# Patient Record
Sex: MALE | Race: WHITE
[De-identification: names, ages, dates, MRNs, and addresses within clinical notes are randomized per-mention and may not be internally consistent; named-entity substitution may affect disease eponyms.]

---

## 2017-10-25 ENCOUNTER — HOSPITAL ENCOUNTER (OUTPATIENT)
Dept: HOSPITAL 92 - MRI | Age: 57
Discharge: HOME | End: 2017-10-25
Attending: INTERNAL MEDICINE
Payer: COMMERCIAL

## 2017-10-25 DIAGNOSIS — M50.30: ICD-10-CM

## 2017-10-25 DIAGNOSIS — M25.78: ICD-10-CM

## 2017-10-25 DIAGNOSIS — M99.51: ICD-10-CM

## 2017-10-25 DIAGNOSIS — M47.22: Primary | ICD-10-CM

## 2017-10-25 PROCEDURE — 72141 MRI NECK SPINE W/O DYE: CPT

## 2017-10-25 NOTE — MRI
MRI CERVICAL SPINE:

10/25/17

 

HISTORY: 

Cervical radiculopathy, M54.12.

 

Multiplanar and multisequence noncontrast enhanced MRI images cervical spine obtained. 

 

Comparison made to previous exam from 2/21/14. 

 

Marrow signal is unremarkable. 

 

C1-2: Unremarkable. 

 

C2-3: There is a broad based disc osteophyte complex centrally compressing the thecal sac resulting 
in some anterior compression of the thecal sac. No definite evidence of cord compression is seen. Mo
derate bilateral neural foraminal narrowing is seen, worse on the right than on the left. 

 

C3-4: There is disc desiccation and broad based disc osteophyte complex. This is much larger in the 
left lateral recess region resulting in moderate to severe left L3-4 lateral recess stenosis. The ri
ght neural foramen demonstrates moderate to severe stenosis. There is severe left C3-4 neural forami
nal narrowing due to uncovertebral osteophyte hypertrophy. 

 

C4-5: Disc desiccation is seen. There is a broad based disc osteophyte complex compressing the theca
l sac resulting in moderate central stenosis. There is moderate to severe right and moderate left si
ded C4-5 neural foraminal narrowing due to uncovertebral osteophyte hypertrophy. This is not signifi
cantly changed since the previous comparison exam. 

 

C5-6: There is minimal anterolisthesis of C5 on C6. There is broad based disc osteophyte complex com
pressing the thecal sac resulting in mild central spinal stenosis. The neural foramen are patent. 

 

C6-7: Disc desiccation is seen. There is a broad based disc bulge resulting in mild but not signific
ant degree of central stenosis. The neural foramen are patent. 

 

C7-T1: Mild disc desiccation is seen. Minimal left C7-T1 neural foraminal narrowing is seen. The rig
ht neural foramen is patent. 

 

IMPRESSION:  

Multilevel mid and upper cervical degenerative changes with osteophytes and neural foraminal narrowi
ng as described above. No significant interval change is seen since the previous exam from 2/21/14.

 

POS: EMANUEL

## 2018-10-13 ENCOUNTER — HOSPITAL ENCOUNTER (OUTPATIENT)
Dept: HOSPITAL 92 - SCSCT | Age: 58
Discharge: HOME | End: 2018-10-13
Attending: NEUROLOGICAL SURGERY
Payer: COMMERCIAL

## 2018-10-13 DIAGNOSIS — M99.81: ICD-10-CM

## 2018-10-13 DIAGNOSIS — M48.02: ICD-10-CM

## 2018-10-13 DIAGNOSIS — M54.12: Primary | ICD-10-CM

## 2018-10-13 PROCEDURE — 72125 CT NECK SPINE W/O DYE: CPT

## 2018-10-13 NOTE — CT
CERVICAL SPINE CT SCAN WITHOUT IV CONTRAST:

 

Date:  10/13/18 

 

HISTORY:  

57-year-old male with history of radiculopathy, exacerbation of chronic neck pain for decades. Left-s
ided pain. 

 

COMPARISON:  

10/25/17 MRI. 

 

FINDINGS:

No evidence for overt fracture or facet dislocation. 

 

At C2-C3, there is severe bilateral foraminal stenosis with extensive posterior disc osteophytes, wit
h mild central canal and moderate bilateral recess stenosis. 

 

At C3-C4, there are very extensive disc osteophytes, much worse in the left paracentral region, with 
severe bilateral lateral recess stenosis, greater on the left side and severe left foraminal stenosis
, with moderately severe central canal stenosis. 

 

At C4-C5, there is mild lateral recess stenosis and moderate to severe bilateral foraminal stenosis. 


 

At C5-C6, there is moderate bilateral recess stenosis and severe left foraminal stenosis and moderate
 right foraminal stenosis. 

 

At C6-C7, there is mild to moderate lateral recess and foraminal stenosis. 

 

At C7-T1, there is no significant central canal or foraminal stenosis. 

 

IMPRESSION: 

Variable severity, multilevel, up to severe central canal, lateral recess, and foraminal stenosis, mo
st marked at C3-C4 and C2-C3 levels, with little change from prior MRI. 

 

 

POS: EMANUEL

## 2019-01-06 NOTE — HP
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

[facility], who is known to us from chronic evaluation of both neck and lower back

problems, but most significantly bilateral C4 radiculopathy.  After conservative

methods including injections with Dr. Hayes, he hopes to discuss surgery as a

possible option. 



PAST MEDICAL HISTORY:  Significant for migraines, reflux, gout, and osteoarthritis.



ALLERGIES:  NO KNOWN DRUG ALLERGIES.



PHYSICAL EXAMINATION:

The patient is alert and oriented x3.  Gait is normal.  Cervical range of motion

slightly limited.  Bilateral upper extremity motor exam is normal. 



ASSESSMENT:  Cervical radiculopathy.



PLAN:  Dr. Benitez met with the patient, reviewed imaging, advocated for C3-4 ACDF.

He explained to the patient the risks, benefits, and alternatives to the procedure.

The patient expressed understanding and elected to move forward with surgery as

discussed.  I do believe that the patient is mentally competent and capable of

making medical decisions for himself and will move forward with surgery as planned. 







Job ID:  [id number]

## 2019-01-07 ENCOUNTER — HOSPITAL ENCOUNTER (OUTPATIENT)
Dept: HOSPITAL 92 - SDC | Age: 59
Discharge: HOME | End: 2019-01-07
Attending: NEUROLOGICAL SURGERY
Payer: COMMERCIAL

## 2019-01-07 VITALS — BODY MASS INDEX: 26.5 KG/M2

## 2019-01-07 DIAGNOSIS — Z79.82: ICD-10-CM

## 2019-01-07 DIAGNOSIS — M19.90: ICD-10-CM

## 2019-01-07 DIAGNOSIS — K21.9: ICD-10-CM

## 2019-01-07 DIAGNOSIS — G43.909: ICD-10-CM

## 2019-01-07 DIAGNOSIS — M54.12: Primary | ICD-10-CM

## 2019-01-07 DIAGNOSIS — Z79.899: ICD-10-CM

## 2019-01-07 DIAGNOSIS — M10.9: ICD-10-CM

## 2019-01-07 PROCEDURE — 0RG10A0 FUSION OF CERVICAL VERTEBRAL JOINT WITH INTERBODY FUSION DEVICE, ANTERIOR APPROACH, ANTERIOR COLUMN, OPEN APPROACH: ICD-10-PCS | Performed by: NEUROLOGICAL SURGERY

## 2019-01-07 PROCEDURE — 76000 FLUOROSCOPY <1 HR PHYS/QHP: CPT

## 2019-01-07 PROCEDURE — C1713 ANCHOR/SCREW BN/BN,TIS/BN: HCPCS

## 2019-01-07 PROCEDURE — C1776 JOINT DEVICE (IMPLANTABLE): HCPCS

## 2019-01-07 PROCEDURE — 96374 THER/PROPH/DIAG INJ IV PUSH: CPT

## 2019-01-07 PROCEDURE — 0RT30ZZ RESECTION OF CERVICAL VERTEBRAL DISC, OPEN APPROACH: ICD-10-PCS | Performed by: NEUROLOGICAL SURGERY

## 2019-01-07 NOTE — OP
DATE OF PROCEDURE:  01/07/2019



FIRST ASSIST:  Jaiden Bahena PA-C



INDICATION:  Pain.



DIAGNOSIS:  Cervical radiculopathy.



PROCEDURES PERFORMED:  Anterior cervical diskectomy and fusion C3-C4.



ANESTHESIA:  General.



TECHNIQUE:  The patient was brought into the operating room and placed under general

anesthesia.  He was placed on table in the supine position.  A transverse incision

was planned over the lateral aspect of the neck on the right.  After prepping and

draping and after a preoperative pause, the incision was created.  The underlying

platysma muscle was identified and incised.  A blunt tissue plane anterior to the

sternocleidomastoid muscle was used to gain access to the prevertebral space.  After

identifying the appropriate level C-arm fluoroscopy, an annulotomy was performed in

the C3-C4 disk space.  All disk material was removed.  A match stick drill bit was

used to drill down large posterior osteophytes in the lateral recesses, the left

greater than the right.  After decompressing C3-C4, a 7 mm lordotic PEEK cage packed

with allograft and autograft material were placed within the interbody space.  An

anterior cervical plate was then fashioned to the front of the spine and secured

with a total of 4 fixed screws.  Midline and lateral structures were inspected and

found to be free from significant trauma.  The wound was irrigated.  Hemostasis was

maintained throughout.  The wound was then closed in anatomic layers and a pressure

dressing was applied.  There were no known procedural complications. 







Job ID:  [id number]

## 2019-03-07 ENCOUNTER — HOSPITAL ENCOUNTER (OUTPATIENT)
Dept: HOSPITAL 92 - SCSMRI | Age: 59
Discharge: HOME | End: 2019-03-07
Attending: ORTHOPAEDIC SURGERY
Payer: COMMERCIAL

## 2019-03-07 DIAGNOSIS — M25.512: Primary | ICD-10-CM

## 2019-03-07 DIAGNOSIS — S43.402A: ICD-10-CM

## 2019-03-07 DIAGNOSIS — S46.912A: ICD-10-CM

## 2019-03-07 DIAGNOSIS — M75.102: ICD-10-CM

## 2019-03-07 DIAGNOSIS — M25.412: ICD-10-CM

## 2019-03-07 DIAGNOSIS — M65.9: ICD-10-CM

## 2019-03-07 NOTE — MRI
MRI LEFT SHOULDER WITHOUT CONTRAST

3/7/19

 

HISTORY:

Shoulder pain, M25.512.

 

COMPARISON:  

None. 

 

FINDINGS:  

 

BICEPS TENDON:

There is moderate extra-articular biceps tenosynovitis. The intra-articular biceps tendon with inters
titial tearing and moderate tendinosis. 

 

LABRUM:

There is a posterior labral tear at 9 o'clock. There is also superior labral tearing anterior and pos
terior to the biceps labral anchor. 

 

CARTILAGE:

Mild chondral thinning without full thickness defects. 

 

ROTATOR CUFF:

Full thickness, full width supraspinatus and infraspinatus tendon tears from the footprint retracted 
to the medial humeral head. Torn fibers are severely tendinotic. The teres minor tendon is intact. Mi
ld tendinosis of the subscapularis. Low grade interstitial undersurface partial tearing of the crania
d post fibers.

 

MUSCLES:

There is greater than 50% atrophy of both the supraspinatus and infraspinatus muscles. The infraspina
tus muscle is edematous with interstitial tearing extending along the torn tendon best seen on series
 4, image 18. The deltoid musculature is intact. 

 

BONES:

There was a type II acromion.  There is narrowed subacromial space due to lack of rotator cuff tendon
s occupying this space. Moderate degenerative disease of the acromioclavicular joints with edema in t
he distal clavicle. 

 

No adenopathy. 

 

IMPRESSION:  

1.      Full thickness, full width supraspinatus and infraspinatus tendon tears retracted medial to t
he humeral head with extensive tendinosis and  interstitial tearing of the torn fibers. Greater than 
50% atrophy of both the supraspinatus and infraspinatus muscles. 

2.      Interstitial tearing and tendinosis intra-articular biceps tendon. 

3.      Superior labral tear anterior and posterior to the biceps labral anchor as well as a posterio
r labral tear at 9 o'clock.

4.      Large subacromial, subdeltoid bursa effusion with mild synovitis. 

 

POS: HOME

## 2019-03-22 ENCOUNTER — HOSPITAL ENCOUNTER (OUTPATIENT)
Dept: HOSPITAL 92 - SDC | Age: 59
Discharge: HOME | End: 2019-03-22
Attending: ORTHOPAEDIC SURGERY
Payer: COMMERCIAL

## 2019-03-22 VITALS — BODY MASS INDEX: 25.8 KG/M2

## 2019-03-22 DIAGNOSIS — S43.432A: ICD-10-CM

## 2019-03-22 DIAGNOSIS — M10.9: ICD-10-CM

## 2019-03-22 DIAGNOSIS — M19.90: ICD-10-CM

## 2019-03-22 DIAGNOSIS — Z79.899: ICD-10-CM

## 2019-03-22 DIAGNOSIS — M75.122: Primary | ICD-10-CM

## 2019-03-22 DIAGNOSIS — E78.00: ICD-10-CM

## 2019-03-22 DIAGNOSIS — Z79.1: ICD-10-CM

## 2019-03-22 LAB
ANION GAP SERPL CALC-SCNC: 12 MMOL/L (ref 10–20)
BASOPHILS # BLD AUTO: 0 THOU/UL (ref 0–0.2)
BASOPHILS NFR BLD AUTO: 0.6 % (ref 0–1)
BUN SERPL-MCNC: 16 MG/DL (ref 8.4–25.7)
CALCIUM SERPL-MCNC: 9.4 MG/DL (ref 7.8–10.44)
CHLORIDE SERPL-SCNC: 102 MMOL/L (ref 98–107)
CO2 SERPL-SCNC: 29 MMOL/L (ref 22–29)
CREAT CL PREDICTED SERPL C-G-VRATE: 100 ML/MIN (ref 70–130)
EOSINOPHIL # BLD AUTO: 0 THOU/UL (ref 0–0.7)
EOSINOPHIL NFR BLD AUTO: 1.2 % (ref 0–10)
GLUCOSE SERPL-MCNC: 98 MG/DL (ref 70–105)
HGB BLD-MCNC: 12.9 G/DL (ref 14–18)
LYMPHOCYTES # BLD: 1.2 THOU/UL (ref 1.2–3.4)
LYMPHOCYTES NFR BLD AUTO: 33 % (ref 21–51)
MCH RBC QN AUTO: 34.5 PG (ref 27–31)
MCV RBC AUTO: 107 FL (ref 78–98)
MONOCYTES # BLD AUTO: 0.4 THOU/UL (ref 0.11–0.59)
MONOCYTES NFR BLD AUTO: 10 % (ref 0–10)
NEUTROPHILS # BLD AUTO: 2 THOU/UL (ref 1.4–6.5)
NEUTROPHILS NFR BLD AUTO: 55.3 % (ref 42–75)
PLATELET # BLD AUTO: 184 THOU/UL (ref 130–400)
POTASSIUM SERPL-SCNC: 4 MMOL/L (ref 3.5–5.1)
RBC # BLD AUTO: 3.74 MILL/UL (ref 4.7–6.1)
SODIUM SERPL-SCNC: 139 MMOL/L (ref 136–145)
WBC # BLD AUTO: 3.7 THOU/UL (ref 4.8–10.8)

## 2019-03-22 PROCEDURE — 85025 COMPLETE CBC W/AUTO DIFF WBC: CPT

## 2019-03-22 PROCEDURE — 36415 COLL VENOUS BLD VENIPUNCTURE: CPT

## 2019-03-22 PROCEDURE — A4306 DRUG DELIVERY SYSTEM <=50 ML: HCPCS

## 2019-03-22 PROCEDURE — 80048 BASIC METABOLIC PNL TOTAL CA: CPT

## 2019-03-22 NOTE — OP
DATE OF PROCEDURE:  03/22/2019



PROCEDURE PERFORMED:  Left shoulder open acromioplasty, open rotator cuff

debridement. 



ASSISTANT:  Michael Blum PA-C



ESTIMATED BLOOD LOSS:  Minimal.



SPECIMENS:  None.



DRAINS:  None.



COMPLICATIONS:  None.



DESCRIPTION OF PROCEDURE:  The patient was taken to the operating room, where

general anesthesia was induced.  The left arm was prepped and draped in sterile

fashion.  We made a standard deltoid-splitting approach.  Anterior and inferior

acromioplasty was performed.  Irrigation was performed.  I mobilized subscapularis.

I mobilized the posterior aspect of the cuff, which was retracted at a fair amount.

Unfortunately, the tissue was of poor quality.  I put traction sutures in the cuff

and tissue pulled away.  I got all the way down to the teres minor, which appeared

to be good tendon, but when I placed stitches, this does also pulled away.  There

was really not even good tissue to place in an Arthrex patch, which I had in the

room.  I debrided the rotator cuff.  I debrided the stump of the cuff on the greater

tuberosity.  The biceps then appeared to be in good condition, so I left that alone.

 Irrigation was performed.  Shoulder was closed with #1 Ethibond and subcutaneous

tissue with 2-0 Vicryl and staples. 







Job ID:  [id number]

## 2019-08-15 ENCOUNTER — HOSPITAL ENCOUNTER (OUTPATIENT)
Dept: HOSPITAL 92 - SCSCT | Age: 59
Discharge: HOME | End: 2019-08-15
Payer: COMMERCIAL

## 2019-08-15 DIAGNOSIS — M54.6: ICD-10-CM

## 2019-08-15 DIAGNOSIS — M47.812: ICD-10-CM

## 2019-08-15 DIAGNOSIS — M48.04: ICD-10-CM

## 2019-08-15 DIAGNOSIS — M48.02: Primary | ICD-10-CM

## 2019-08-15 DIAGNOSIS — Z98.1: ICD-10-CM

## 2019-08-15 DIAGNOSIS — M47.814: ICD-10-CM

## 2019-08-15 PROCEDURE — 72146 MRI CHEST SPINE W/O DYE: CPT

## 2019-08-15 PROCEDURE — 72125 CT NECK SPINE W/O DYE: CPT

## 2019-08-15 PROCEDURE — 72141 MRI NECK SPINE W/O DYE: CPT

## 2019-08-15 NOTE — MRI
Exam: MRI cervical spine without contrast



HISTORY: Cervical spinal stenosis. Neck pain..



COMPARISON: 10/25/2017



FINDINGS:

 Interval placement of anterior fusion plate at C3 and C4. There is a disc prosthesis at the C3-C4 le
janelle. Cervical spine vertebral body height is maintained. No fracture. No significant STIR

hyperintensity to suggest vertebral body edema or ligamentous injury.

1.5 mm of anterolisthesis of C5 upon C6 and 2.2 mm of anterolisthesis of C6 upon C7.

Visualized brain parenchyma, cervicomedullary junction, cervical cord and the upper thoracic cord hav
e a normal size and signal intensity





C2-C3: Moderate central canal stenosis secondary to a broad-based disc osteophyte complex. Moderate b
ilateral neural foraminal narrowing.



C3-C4: Central/left paracentral osteophyte ridge. Mass effect and deformity the left hemicord, withou
t cord signal abnormality. Moderate narrowing of the left aspect of the central spinal canal. Right

neural foramen and left neural foramina are moderately narrowed due to uncal vertebral hypertrophy



C4-C5: Broad-based disc bulge effacing the ventral subarachnoid space. There is contact upon the vent
ral cord, without T2 hyperintensity in the cord. Mild central canal stenosis. Mild to moderate

bilateral foraminal narrowing due to uncovertebral hypertrophy. There is severe bilateral (right grea
ter than left) facet hypertrophy.



C5-C6: Broad-based disc bulge abuts the thecal sac. Ventral subarachnoid space is effaced. Disc mater
ial contacts the ventral aspect of the cord without cord signal abnormality or deformity. Mild

central canal stenosis. Right neural foramen is patent. Mild left foraminal narrowing



C6-C7: Broad-based disc bulge abuts the thecal sac. Subarachnoid space maintained. Mild central canal
 stenosis. Neural foramina are patent bilaterally.



C7-T1: Broad-based disc bulge abuts the thecal sac. Subarachnoid space is nearly effaced. There is so
me mass effect and deformity of the left hemicord. Mild central canal stenosis. Bilaterally, neural

foramina are patent.



IMPRESSION:

 

1. Cervical fusion from C3 through C4.

2. Moderate central canal stenosis at C2-C3.

3. Moderate neural foraminal narrowing at C3-C4 and mild to moderate narrowing of the neural foramina
 at C4-C5.

----------------------



Transcribed Date/Time: 8/15/2019 3:50 PM



Reported By: Rahat Palomares 

Electronically Signed:  8/15/2019 4:20 PM

## 2019-08-15 NOTE — MRI
MRI thoracic spine noncontrast:



DATE:

8/15/2019



HISTORY:

58-year-old male with mid thoracic back pain.



COMPARISON:

None



FINDINGS:

ACDF hardware at C3 and C4. Severe cervical central spinal canal stenosis with chronic cord compressi
on in the cervical spine. See separate report of C-spine MRI.



Mild depressions of the superior endplates of T4, T5, and T11 surrounding Schmorl's nodes. Incomplete
ly visualized depression of superior endplate of L2 in the lumbar spine. None of these are

associated with bone marrow edema.



Small focal disc herniations or disc-osteophyte complexes encroach upon the anterior aspect of the sp
inal canal at multiple levels. Some of the more prominent such protrusions include:



T4-5: Small central disc protrusion mildly indents the ventral surface of spinal cord.

T5-6: Small right paracentral focal disc herniation

T6-7: Small left lateral disc protrusion minimally indents the left ventral surface of spinal cord.

T7-8:Small left lateral disc protrusion minimally indents the left ventral surface of spinal cord.



T8-9: Small left lateral disc protrusion minimally indents the left ventral surface of spinal cord an
d also minimally displaces the spinal cord to the right.



T9-10:Small left lateral disc protrusion minimally indents the left ventral surface of spinal cord.



T10-11: Midline and bilateral paracentral broad-based shallow disc protrusion does not contact spinal
 cord.



Conus medullaris terminates at L1. No high-grade central spinal canal stenosis at any level. No major
 pathology of perivertebral spaces identified.



IMPRESSION:

1. Mild old compression fractures of T4, T5, and T11.

2. Mild thoracic spondylosis with multilevel small focal disc herniations or disc-osteophyte complexe
s protruding into the spinal canal, some minimally impinging on the spinal cord.

3. No high-grade central spinal canal stenosis at any level in the thoracic spine.

4. Severe central spinal canal stenosis with chronic cord compression in the cervical spine. See sepa
rate report of C-spine MRI.



Reported By: Trung Zimmer 

Electronically Signed:  8/15/2019 3:50 PM

## 2019-08-15 NOTE — CT
CT OF THE CERVICAL SPINE WITHOUT CONTRAST:

8/15/19

 

INDICATION:

History of cervical spinal stenosis with continued neck pain that radiates into the upper thoracic sp
inal region. History of spinal fusion at C3-C4 in April of 2019.

 

COMPARISON: 

CT of the cervical spine dated 10/13/18.

 

TECHNIQUE:  

Multiple CT images were obtained of the cervical spine without IV contrast. Axial, coronal and sagitt
al reformatted images  were constructed from the raw data.

 

FINDINGS: 

Since the comparison examination, there has been interval placement of an ACDF and intervertebral dis
c cage at C3-C4. The anterior translation of C5 on C6 is stable. The mild anterior translation of C6 
on C7 is stable. Craniocervical junction appears within normal limits. 

 

At the C2-C3 level, there remains uncovertebral hypertrophy and facet degenerative change inducing mi
ld osseous neural foraminal narrowing bilaterally. 

 

At C3-4, there is a residual osteophyte complex with facet joint degenerative change likely inducing 
some residual mild to moderate osseous central canal narrowing which is relatively stable. There is m
ild right and moderate left osseous neural foraminal narrowing which appears stable. 

 

At C4-5, there is uncovertebral hypertrophy and facet joint degenerative change likely inducing mild 
left and moderate to severe right osseous neural foraminal narrowing which appears stable. 

 

At C5-6, there is uncovertebral hypertrophy and facet degenerative change without appreciable osseous
 central canal and neural foraminal narrowing. 

 

At C6-7, there is no appreciable osseous central canal or neural foraminal narrowing. 

 

At C7-T1, there is no appreciable osseous central canal or neural foraminal narrowing. 

 

Lung apices demonstrate mild interstitial fibrotic change. The visualized prevertebral soft tissues a
ppear within normal limits. 

 

IMPRESSION: 

1.      Interval postoperative change of an ACDF at C3-C4. The mild to moderate central canal narrowi
ng at C3-C4 appears similar appearing. The multilevel neural foraminal narrowing is stable.

2.      Degenerative anterior translation of C5 on C6 and C6 on C7 is stable. 

 

POS: OFF

## 2019-11-23 ENCOUNTER — HOSPITAL ENCOUNTER (EMERGENCY)
Dept: HOSPITAL 92 - ERS | Age: 59
Discharge: ANOTHER HOSPITAL | End: 2019-11-23
Payer: COMMERCIAL

## 2019-11-23 DIAGNOSIS — E78.5: ICD-10-CM

## 2019-11-23 DIAGNOSIS — F32.9: ICD-10-CM

## 2019-11-23 DIAGNOSIS — F41.9: ICD-10-CM

## 2019-11-23 DIAGNOSIS — Z79.899: ICD-10-CM

## 2019-11-23 DIAGNOSIS — L76.82: Primary | ICD-10-CM

## 2019-11-23 DIAGNOSIS — R53.1: ICD-10-CM

## 2019-11-23 DIAGNOSIS — K21.9: ICD-10-CM

## 2019-11-23 PROCEDURE — 81003 URINALYSIS AUTO W/O SCOPE: CPT

## 2019-11-23 PROCEDURE — 72141 MRI NECK SPINE W/O DYE: CPT

## 2019-11-23 PROCEDURE — 96374 THER/PROPH/DIAG INJ IV PUSH: CPT

## 2019-11-23 PROCEDURE — 72146 MRI CHEST SPINE W/O DYE: CPT

## 2019-11-23 PROCEDURE — 96375 TX/PRO/DX INJ NEW DRUG ADDON: CPT

## 2019-11-23 NOTE — MRI
MRI Thoracic Spine WO Con



History: Pain



Comparison: MRI thoracic spine August thousand 19



Findings: No acute fracture no malalignment. No marrow infiltrative process.



The superior endplate compression deformities at T4, T5, and T11 are similar.



No cord compression. The disc osteophyte complexes are similar with minimal effacement of ventral CSF
 space. No new disc herniation.



Impression: Unchanged examination of the thoracic spine. No cord impingement. Disc osteophyte complex
es are similar. No acute fracture.



Reported By: Arnaldo Norwood 

Electronically Signed:  11/23/2019 5:49 PM

## 2019-11-23 NOTE — MRI
MRI Cervical Spine WO Con



History: Pain



Comparison: Cervical spine MRI August 2019



Findings: Exam is nondiagnostic due to severe motion throughout the exam. Large postoperative seroma.
 Disc space evaluation is unable to be performed. Laminectomy changes upper cervical spine.



Impression: Nondiagnostic examination due to severe motion. If necessary, repeat examination under an
esthesia with be helpful.



Reported By: Arnaldo Norwood 

Electronically Signed:  11/23/2019 5:19 PM

## 2019-12-24 ENCOUNTER — HOSPITAL ENCOUNTER (INPATIENT)
Dept: HOSPITAL 92 - ERS | Age: 59
LOS: 1 days | Discharge: HOME | DRG: 682 | End: 2019-12-25
Attending: INTERNAL MEDICINE | Admitting: INTERNAL MEDICINE
Payer: COMMERCIAL

## 2019-12-24 VITALS — BODY MASS INDEX: 23.6 KG/M2

## 2019-12-24 DIAGNOSIS — M48.061: ICD-10-CM

## 2019-12-24 DIAGNOSIS — N17.9: Primary | ICD-10-CM

## 2019-12-24 DIAGNOSIS — G25.81: ICD-10-CM

## 2019-12-24 DIAGNOSIS — E44.0: ICD-10-CM

## 2019-12-24 DIAGNOSIS — Z86.711: ICD-10-CM

## 2019-12-24 DIAGNOSIS — Z79.899: ICD-10-CM

## 2019-12-24 DIAGNOSIS — G92: ICD-10-CM

## 2019-12-24 DIAGNOSIS — E87.1: ICD-10-CM

## 2019-12-24 DIAGNOSIS — F32.9: ICD-10-CM

## 2019-12-24 DIAGNOSIS — N18.2: ICD-10-CM

## 2019-12-24 DIAGNOSIS — K21.9: ICD-10-CM

## 2019-12-24 DIAGNOSIS — Z79.01: ICD-10-CM

## 2019-12-24 DIAGNOSIS — F41.9: ICD-10-CM

## 2019-12-24 DIAGNOSIS — T36.8X5A: ICD-10-CM

## 2019-12-24 DIAGNOSIS — D53.9: ICD-10-CM

## 2019-12-24 DIAGNOSIS — Z21: ICD-10-CM

## 2019-12-24 LAB
ALBUMIN SERPL BCG-MCNC: 3.8 G/DL (ref 3.5–5)
ALP SERPL-CCNC: 115 U/L (ref 40–110)
ALT SERPL W P-5'-P-CCNC: 17 U/L (ref 8–55)
ANION GAP SERPL CALC-SCNC: 14 MMOL/L (ref 10–20)
APTT PPP: 38.1 SEC (ref 22.9–36.1)
AST SERPL-CCNC: 36 U/L (ref 5–34)
BASOPHILS # BLD AUTO: 0.1 THOU/UL (ref 0–0.2)
BASOPHILS NFR BLD AUTO: 0.6 % (ref 0–1)
BILIRUB SERPL-MCNC: 0.3 MG/DL (ref 0.2–1.2)
BUN SERPL-MCNC: 21 MG/DL (ref 8.4–25.7)
CALCIUM SERPL-MCNC: 9.8 MG/DL (ref 7.8–10.44)
CHLORIDE SERPL-SCNC: 96 MMOL/L (ref 98–107)
CO2 SERPL-SCNC: 27 MMOL/L (ref 22–29)
CREAT CL PREDICTED SERPL C-G-VRATE: 0 ML/MIN (ref 70–130)
EOSINOPHIL # BLD AUTO: 0.1 THOU/UL (ref 0–0.7)
EOSINOPHIL NFR BLD AUTO: 0.9 % (ref 0–10)
GLOBULIN SER CALC-MCNC: 2.8 G/DL (ref 2.4–3.5)
GLUCOSE SERPL-MCNC: 96 MG/DL (ref 70–105)
HGB BLD-MCNC: 9.9 G/DL (ref 14–18)
INR PPP: 1.2
LYMPHOCYTES # BLD: 2 THOU/UL (ref 1.2–3.4)
LYMPHOCYTES NFR BLD AUTO: 21.8 % (ref 21–51)
MCH RBC QN AUTO: 33 PG (ref 27–31)
MCV RBC AUTO: 100 FL (ref 78–98)
MONOCYTES # BLD AUTO: 0.9 THOU/UL (ref 0.11–0.59)
MONOCYTES NFR BLD AUTO: 10 % (ref 0–10)
NEUTROPHILS # BLD AUTO: 6 THOU/UL (ref 1.4–6.5)
NEUTROPHILS NFR BLD AUTO: 66.8 % (ref 42–75)
PLATELET # BLD AUTO: 603 THOU/UL (ref 130–400)
POTASSIUM SERPL-SCNC: 4 MMOL/L (ref 3.5–5.1)
PROT UR STRIP.AUTO-MCNC: 30 MG/DL
PROTHROMBIN TIME: 14.9 SEC (ref 12–14.7)
RBC # BLD AUTO: 3 MILL/UL (ref 4.7–6.1)
RBC UR QL AUTO: (no result) HPF (ref 0–3)
SODIUM SERPL-SCNC: 133 MMOL/L (ref 136–145)
SODIUM UR-SCNC: (no result) MMOL/L
WBC # BLD AUTO: 8.9 THOU/UL (ref 4.8–10.8)
WBC UR QL AUTO: (no result) HPF (ref 0–3)

## 2019-12-24 PROCEDURE — 85025 COMPLETE CBC W/AUTO DIFF WBC: CPT

## 2019-12-24 PROCEDURE — 93005 ELECTROCARDIOGRAM TRACING: CPT

## 2019-12-24 PROCEDURE — 89190 NASAL SMEAR FOR EOSINOPHILS: CPT

## 2019-12-24 PROCEDURE — 72128 CT CHEST SPINE W/O DYE: CPT

## 2019-12-24 PROCEDURE — 80069 RENAL FUNCTION PANEL: CPT

## 2019-12-24 PROCEDURE — 82570 ASSAY OF URINE CREATININE: CPT

## 2019-12-24 PROCEDURE — 85048 AUTOMATED LEUKOCYTE COUNT: CPT

## 2019-12-24 PROCEDURE — 83735 ASSAY OF MAGNESIUM: CPT

## 2019-12-24 PROCEDURE — 96360 HYDRATION IV INFUSION INIT: CPT

## 2019-12-24 PROCEDURE — 83605 ASSAY OF LACTIC ACID: CPT

## 2019-12-24 PROCEDURE — 96361 HYDRATE IV INFUSION ADD-ON: CPT

## 2019-12-24 PROCEDURE — 85730 THROMBOPLASTIN TIME PARTIAL: CPT

## 2019-12-24 PROCEDURE — 36415 COLL VENOUS BLD VENIPUNCTURE: CPT

## 2019-12-24 PROCEDURE — 71045 X-RAY EXAM CHEST 1 VIEW: CPT

## 2019-12-24 PROCEDURE — 72125 CT NECK SPINE W/O DYE: CPT

## 2019-12-24 PROCEDURE — 72131 CT LUMBAR SPINE W/O DYE: CPT

## 2019-12-24 PROCEDURE — 87536 HIV-1 QUANT&REVRSE TRNSCRPJ: CPT

## 2019-12-24 PROCEDURE — 87086 URINE CULTURE/COLONY COUNT: CPT

## 2019-12-24 PROCEDURE — 85610 PROTHROMBIN TIME: CPT

## 2019-12-24 PROCEDURE — 86361 T CELL ABSOLUTE COUNT: CPT

## 2019-12-24 PROCEDURE — 76770 US EXAM ABDO BACK WALL COMP: CPT

## 2019-12-24 PROCEDURE — 82607 VITAMIN B-12: CPT

## 2019-12-24 PROCEDURE — 81003 URINALYSIS AUTO W/O SCOPE: CPT

## 2019-12-24 PROCEDURE — 84300 ASSAY OF URINE SODIUM: CPT

## 2019-12-24 PROCEDURE — 70450 CT HEAD/BRAIN W/O DYE: CPT

## 2019-12-24 PROCEDURE — 81015 MICROSCOPIC EXAM OF URINE: CPT

## 2019-12-24 PROCEDURE — 82746 ASSAY OF FOLIC ACID SERUM: CPT

## 2019-12-24 PROCEDURE — 87040 BLOOD CULTURE FOR BACTERIA: CPT

## 2019-12-24 PROCEDURE — 80053 COMPREHEN METABOLIC PANEL: CPT

## 2019-12-24 NOTE — CT
PRELIMINARY REPORT/[facility]/EMERGENCY AFTER HOURS PROCEDURE: 

 

HISTORY:  [id number] presents to ED for evaluation of fall and AMS. Patient had a C spine fusion and laminec
lori at [facility] on 12/09/19. While he was there he was treated for a UTI and a small PE. Pt 
was discharged Thursday. Starting on Saturday, the patient c/o muscle weakness to bilateral legs. Par
tner reports that today he came home and the patient was on the ground after falling and was altered.
 Unknown LOC. Partner reports that the patient had normal mental status this morning. Patient denies 
urinary complaints and incontinence of bowel or bladder. Denies N/V/D. 

 

CT HEAD 

 

TECHNIQUE:  Without contrast. 

COMPARISON: None. 

LIMITATIONS: None. 

 

BRAIN:  No acute hemorrhage. Normal gray/white matter differentiation.  No mass, mass effect or midli
ne shift.  Normal sized sulci and cisterns. 

VENTRICLES: No hydrocephalus. 

EXTRA-AXIAL SPACES:  No hemorrhages, fluid collections, or masses. 

CALVARIUM/SKULL BASE:  Normal. 

FACE/SINUSES:  Visualized portions normal. 

SOFT TISSUES: Postsurgical changes at midline in the suboccipital region, with overlying skin staples
. 

OTHER:  Mildly calcified internal carotid arteries. 

 

CONCLUSION: 

No acute intracranial abnormality.

 

 

ELECTRONICALLY SIGNED BY:

Coral Carlson M.D.

Dec 24, 2019 3:00:52 AM CST

 

 

 

This report is intended for review by the ordering physician only, in accordance of law. If you recei
ve this report in error, please call [facility] at [phone].

 

 

 

FINAL REPORT 

 

HEAD CT WITHOUT CONTRAST:

 

Date:  12/24/19 

 

HISTORY:  

Fall. Altered mental status. 

 

COMPARISON:  

None. 

 

FINDINGS/IMPRESSION: 

This report is in agreement with the initial report by [facility]. No intracranial post-traumat
ic sequelae. 

 

 

POS: OFF

## 2019-12-24 NOTE — ULT
Exam: Bilateral renal ultrasound



HISTORY: Acute kidney insufficiency.



COMPARISON: None





FINDINGS: 

Right kidney: Normal cortical echotexture. No hydronephrosis.

Right kidney measurements: 5.2 x 5.5 x 11.6 cm. 

Left kidney: Normal cortical echotexture. No hydronephrosis

Left kidney measurements 5.8 x 5.3 x 11.8 cm. 



Urinary bladder: Normal mucosa.





IMPRESSION: No hydronephrosis.



Reported By: Rahat Palomares 

Electronically Signed:  12/24/2019 8:45 AM

## 2019-12-24 NOTE — RAD
Exam: Chest one view



HISTORY:Fall. Altered mental status.



Comparison: None



FINDINGS:

Cardiac silhouette: Normal

Aorta: Unremarkable

Pulmonary vessels: Normal

Costophrenic angles: Clear



LUNGS: No masses or consolidation.



Pneumothorax: None



Osseous abnormalities: Incompletely evaluated fusion hardware



IMPRESSION: No acute cardiopulmonary process.



Reported By: Rahat Palomares 

Electronically Signed:  12/24/2019 9:10 AM

## 2019-12-24 NOTE — CT
PRELIMINARY REPORT/[facility]/EMERGENCY AFTER HOURS PROCEDURE: 

 

HISTORY:  [id number] presents to ED for evaluation of fall and AMS. Patient had a C spine fusion and laminec
lori at [facility] on 12/09/19. While he was there he was treated for a UTI and a small PE. Pt 
was discharged Thursday. Starting on Saturday, the patient c/o muscle weakness to bilateral legs. Par
tner reports that today he came home and the patient was on the ground after falling and was altered.
 Unknown LOC. Partner reports that the patient had normal mental status this morning. Patient denies 
urinary complaints and incontinence of bowel or bladder. Denies N/V/D. 

 

CT LUMBAR SPINE 

TECHNIQUE: Multiplanar reconstruction. 

COMPARISON: None. 

LIMITATIONS: None. 

 

FRACTURES: None. 

ALIGNMENT: Normal. 

MINERALIZATION: Decreased. 

VERTEBRA BODIES: L2 upper endplate Schmorl's node. 

DISC SPACES: Moderately narrowed with vacuum phenomenon at L1-L2.  L3-L4 and L4-L5 broad-based disc b
ulges, measuring 2.7 mm and 8.0 mm, respectively. 

POSTERIOR ELEMENTS: Normal. 

NEUROFORAMEN:  Moderate bilateral L4-L5 narrowing. 

SPINAL CANAL: Mildly narrowed at L3-L4 and moderate to severe narrowing at L4-L5. 

PARASPINAL TISSUES: Normal. 

OTHER: None. 

 

IMPRESSION: 

Large L4-L5 broad-based disc bulge with moderately severe spinal canal stenosis.

 

ELECTRONICALLY SIGNED BY:

Coral Carlson M.D.

Dec 24, 2019 3:06:30 AM CST

 

This report is intended for review by the ordering physician only, in accordance of law. If you recei
ve this report in error, please call [facility] at [phone].

 

 

 

 

FINAL REPORT 

 

CT LUMBAR SPINE WITHOUT CONTRAST: 

 

Date:  12/24/19 

 

HISTORY:  

Fall. Altered mental status. Recent cervical fusion. 

 

FINDINGS/IMPRESSION: 

Chronic changes involving the superior end plate of L2 with associated Schmorl's node. Vertebral body
 heights are maintained. There is no fracture. There are varying degrees of central canal stenosis an
d neural foraminal narrowing on the basis of degenerative change. Limited evaluation by technique. At
 least mild to moderate central canal stenosis at L3-L4. Moderate to severe central canal stenosis at
 L4-L5. Mild to moderate central canal stenosis at L5-S1. There is partial obscuration of bilateral t
raversing S1 nerve roots. Vacuum disc phenomenon is noted. 

 

This report is in agreement with the initial report by [facility].

 

POS: OFF

## 2019-12-24 NOTE — CT
PRELIMINARY REPORT/[facility]/EMERGENCY AFTER HOURS PROCEDURE: 

 

HISTORY:  [id number] presents to ED for evaluation of fall and AMS. Patient had a C spine fusion and laminec
olri at [facility] on 12/09/19. While he was there he was treated for a UTI and a small PE. Pt 
was discharged Thursday. Starting on Saturday, the patient c/o muscle weakness to bilateral legs. Par
tner reports that today he came home and the patient was on the ground after falling and was altered.
 Unknown LOC. Partner reports that the patient had normal mental status this morning. Patient denies 
urinary complaints and incontinence of bowel or bladder. Denies N/V/D. 

 

CT CERVICAL SPINE 

 

TECHNIQUE: Multiplanar reformatted images provided. 

COMPARISON: None. 

LIMITATIONS: None. 

 

PRIOR SURGERY/HARDWARE: Posterior fusion hardware spanning from C2 to T3.  Anterior plate and screw f
ixation hardware at C3 and C4 with interbody spacer.  No signs of hardware complication.  Prior bilat
eral C2 to C7 laminectomies. 

FRACTURES: None. 

ALIGNMENT: Grade I anterolisthesis of C6 on C7. 

MINERALIZATION: Decreased. 

DISC SPACES: Mild diffuse narrowing. 

POSTERIOR ELEMENTS: Multilevel facet arthropathy and neuroforaminal narrowing. 

SPINAL CANAL: Limited evaluation due to streak artifact. 

PARASPINAL SOFT TISSUES: Postsurgical changes at midline in the posterior neck. 

OTHER: None. 

 

CONCLUSION: 

No acute cervical spine fracture. 

 

Postsurgical changes from recent spinal surgery.

 

ELECTRONICALLY SIGNED BY:

Coral Carlson M.D.

Dec 24, 2019 3:22:46 AM CST

 

This report is intended for review by the ordering physician only, in accordance of law. If you recei
ve this report in error, please call [facility] at [phone].

 

 

 

FINAL REPORT 

 

CT CERVICAL SPINE WITHOUT CONTRAST:      

 

Date:  12/24/19 

 

HISTORY:  

Cervical fusion and laminectomy at [facility] on 12/09/19. Fall. 

 

COMPARISON:  

08/15/19. 

 

FINDINGS/IMPRESSION: 

This report is in agreement with the initial report by [facility].

Extensive postsurgical change as described in the initial report by [facility]. There appears t
o be scar tissue at the operative site. Evaluation of the central spinal canal and neural foramina ar
e limited due to technique. Significant neural foraminal narrowing is noted. There is no evidence of 
fracture. Postoperative changes in soft tissues of midline of neck are noted. There appears to be a m
ild compression fracture involving the superior aspect of T1 and T2. There does appear to be sclerosi
s which suggests a possible chronic process. Correlate clinically for point tenderness. 

 

 

POS: OFF

## 2019-12-24 NOTE — CT
PRELIMINARY REPORT/[facility]/EMERGENCY AFTER HOURS PROCEDURE: 

 

HISTORY:  [id number] presents to ED for evaluation of fall and AMS. Patient had a C spine fusion and laminec
lori at [facility] [place] [place] on 12/09/19. While he was there he was treated for a UTI and a small PE. Pt 
was discharged Thursday. Starting on Saturday, the patient c/o muscle weakness to bilateral legs. Par
tner reports that today he came home and the patient was on the ground after falling and was altered.
 Unknown LOC. Partner reports that the patient had normal mental status this morning. Patient denies 
urinary complaints and incontinence of bowel or bladder. Denies N/V/D. 

 

CT THORACIC SPINE 

TECHNIQUE: Multiplanar reconstruction. 

COMPARISON: None. 

LIMITATIONS: None. 

 

PRIOR SURGERY/HARDWARE: Status post posterior fusion with hardware spanning from C2 to T3.  The hardw
are appears intact. 

FRACTURES: None. 

ALIGNMENT: Normal. 

MINERALIZATION: Decreased. 

VERTEBRA BODIES: Mild compression deformities at T1, T2, T4 and T11. 

DISC SPACES: Multilevel disc space narrowing and vacuum phenomena. 

POSTERIOR ELEMENTS: Normal. 

SPINAL CANAL: No evidence of spinal stenosis. 

PARASPINAL TISSUES: Postsurgical changes from recent posterior spinal fusion.  No soft tissue air or 
focal fluid collection seen. 

OTHER: Small strandy densities bilaterally, likely atelectasis/scarring. 

 

IMPRESSION: 

No acute thoracic spine fracture.

 

ELECTRONICALLY SIGNED BY:

Coral Carlson M.D.

Dec 24, 2019 3:13:51 AM CST

 

This report is intended for review by the ordering physician only, in accordance of law. If you recei
ve this report in error, please call [facility] at [phone].

 

 

 

FINAL REPORT 

 

CT THORACIC SPINE WITHOUT CONTRAST: 

 

Date:  12/24/19 

 

HISTORY:  

Recent cervical fusion. Fall. Pain. 

 

FINDINGS/IMPRESSION: 

There appear to be mild compression fractures at T1, T2, T4, and T11. Sclerosis of the end plates sug
gest a chronic process. Correlate clinically. Definite acute fractures are not appreciated. 

 

This report is in agreement with the initial report by [facility].  

 

POS: OFF

## 2019-12-24 NOTE — HP
PRIMARY CARE PHYSICIAN:  Kassy Mercer MD



CHIEF COMPLAINT:  Altered mental status.



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

our emergency room, he is an ER physician, and he also has a history of severe

cervical spine disease, and was recently hospitalized at [facility] in order

to have an anterior cervical disk fusion.  He says that he had the surgery, but

while he was in the hospital, he developed high fever and developed sepsis.  He says

he was diagnosed with ESBL E coli and was placed on IV antibiotics.  He also says

that during his hospitalization, he was also found to have bilateral pulmonary

embolisms and was placed on Eliquis.  He was treated and then released on this past

Thursday.  When he was at home, he suffered a fall and his partner found him on the

floor and states that he was altered.  His partner says that his words were jumbled

and it was hard to understand, and then yesterday, it was his birthday, and he asked

for cake, and says that he took the top part off the cake and put it off to the side

and then apparently put some food in an unusual location and seemed confused.  The

patient himself says he has been having difficulties with word finding for the past

year.  He says he has been seeing a psychologist for this.  He admits to me that he

has been very depressed lately and says that he is on an SSRI, but he could not

remember the name of it and states that he has been very concerned that he could be

developing dementia as his father had Alzheimer disease.  Currently, the patient is

having extreme difficulty finding words and in fact, it took almost 15 to 20 minutes

just to get a partial list of his medications.  He has difficulty recollecting what

happened to him while he was in the hospital.  He does say that he did have an

episode of confusion where he had to be placed in restraints and says this was

reaction to medications. 



REVIEW OF SYSTEMS:  All systems were reviewed and are negative except for that

mentioned in the history of present illness. 



PAST MEDICAL HISTORY:  Significant for migraines, gastroesophageal reflux disease,

gout, osteoarthritis, bilateral pulmonary embolisms, restless legs syndrome,

depression.  He is HIV positive. 



PAST SURGICAL HISTORY:  He has had anterior cervical disk fusion and shoulder

arthroplasty. 



ALLERGIES:  NO KNOWN DRUG ALLERGIES.



SOCIAL HISTORY:  He is a nonsmoker and nondrinker.  No children, and he does have a

partner. 



FAMILY HISTORY:  Significant for Alzheimer disease in his father.  Sister had

osteoarthritis.  Mother had Parkinson disease. 



CURRENT MEDICATIONS:  Include;

1. Bactrim DS.

2. Prednisone as needed.

3. Fentanyl 75 mcg every three days.

4. Zofran 8 mg as needed.

5. Crestor 20 mg at bedtime.

6. Amitriptyline 50 mg at bedtime.

7. Bupropion  mg daily.

8. Eliquis 5 mg twice a day.

9. Celebrex 200 mg twice a day.

10. Zonisamide 50 mg t.i.d.

11. Omeprazole 40 mg at bedtime.

12. __________ at bedtime.

13. Carvedilol 6.25 mg twice a day.

14. Valacyclovir 500 mg daily.

15. Requip 2 mg at bedtime.

16. Genvoya daily and an SSRI.



PHYSICAL EXAMINATION:

GENERAL:  He is alert and oriented.  He appears to be in no acute distress.  He is

well developed and well nourished. 

VITAL SIGNS:  Blood pressure 125/60, heart rate 80, respiratory rate of 16, and he

is afebrile. 

HEENT:  Pupils are equal, round, and reactive to light.  Extraocular muscles are

intact.  Sclerae are anicteric.  Throat, no erythema, no exudates, but he does have

dry mucous membranes. 

NECK:  Neck is in a C-collar. 

LUNGS:  Clear to auscultation.  No wheezing.  No rales.  No rhonchi. 

CARDIOVASCULAR:  He has a normal S1 and S2.  There is no S3 or S4.  No murmurs,

clicks, or rubs. 

ABDOMEN:  Obese.  It is soft, nontender, and nondistended.  Positive for bowel

sounds.  No rebound.  No guarding.  No organomegaly. 

EXTREMITIES:  There is no clubbing or cyanosis.  No edema.  No calf tenderness.  No

joint effusions. 

NEUROLOGIC:  Grossly nonfocal.



LABORATORY DATA:  Lab results; white blood cell count 8.9, hemoglobin 9.9,

hematocrit is 30, platelet count is 603.  INR is 1.2.  Sodium 133, potassium 4.0,

chloride is 96, CO2 is 27, BUN of 21, creatinine 2.63, glucose is 96.  Urinalysis is

essentially negative.  There was evidence of hyaline casts. 



ASSESSMENT:  This is a 59-year-old gentleman, who presents with altered mental

status and a fall, the etiology of which is unknown.  It could be medication related

as he is on a fairly high dose of the fentanyl patch along with other medications,

which are at high risk for falls, including the amitriptyline and Requip.  He also

was found to have acute kidney injury, which could be due to volume depletion.  He

is also on Bactrim DS, which can cause an interstitial nephritis and he is also on

NSAIDs. 

1. For the altered mental status.  This seems to be improving.  We may need to cut

back on the dose of the fentanyl patch and possibly the Requip as well.  Also

request the records from [facility]. 

2. Acute kidney injury.  We will place him on IV fluids for gentle hydration.  Check

the urine electrolytes so as to calculate fractional excretion of sodium as well as

check a renal ultrasound and urine eosinophils because of the Bactrim. 

3. History of human immunodeficiency virus positive.  The patient says he has been

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  He sees Dr. Toledo regularly and says his viral load has been undetectable.  We will consult Dr. Toledo for further recommendations.  Continue Genvoya.  He will probably need to take

__________. 

4. History of pulmonary embolism.  Continue Eliquis twice daily.

5. Depression.  Need to reconcile and restart his antidepressant medications.  We

will also consider a PT and OT consult. 







Job ID:  [id number]

## 2019-12-25 VITALS — SYSTOLIC BLOOD PRESSURE: 110 MMHG | TEMPERATURE: 98 F | DIASTOLIC BLOOD PRESSURE: 74 MMHG

## 2019-12-25 LAB
ALBUMIN SERPL BCG-MCNC: 3 G/DL (ref 3.5–5)
ANION GAP SERPL CALC-SCNC: 11 MMOL/L (ref 10–20)
BASOPHILS # BLD AUTO: 0 THOU/UL (ref 0–0.2)
BASOPHILS NFR BLD AUTO: 0.3 % (ref 0–1)
BUN SERPL-MCNC: 16 MG/DL (ref 8.4–25.7)
BUN/CREAT SERPL: 10.67
CALCIUM SERPL-MCNC: 8.8 MG/DL (ref 7.8–10.44)
CHLORIDE SERPL-SCNC: 106 MMOL/L (ref 98–107)
CO2 SERPL-SCNC: 25 MMOL/L (ref 22–29)
CREAT CL PREDICTED SERPL C-G-VRATE: 58 ML/MIN (ref 70–130)
EOSINOPHIL # BLD AUTO: 0 THOU/UL (ref 0–0.7)
EOSINOPHIL NFR BLD AUTO: 0.9 % (ref 0–10)
GLUCOSE SERPL-MCNC: 89 MG/DL (ref 70–105)
HGB BLD-MCNC: 8 G/DL (ref 14–18)
LYMPHOCYTES # BLD: 1.7 THOU/UL (ref 1.2–3.4)
LYMPHOCYTES NFR BLD AUTO: 36.5 % (ref 21–51)
MAGNESIUM SERPL-MCNC: 2 MG/DL (ref 1.6–2.6)
MCH RBC QN AUTO: 33.6 PG (ref 27–31)
MCV RBC AUTO: 101 FL (ref 78–98)
MONOCYTES # BLD AUTO: 0.6 THOU/UL (ref 0.11–0.59)
MONOCYTES NFR BLD AUTO: 12.4 % (ref 0–10)
NEUTROPHILS # BLD AUTO: 2.3 THOU/UL (ref 1.4–6.5)
NEUTROPHILS NFR BLD AUTO: 49.9 % (ref 42–75)
PLATELET # BLD AUTO: 469 THOU/UL (ref 130–400)
POTASSIUM SERPL-SCNC: 3.5 MMOL/L (ref 3.5–5.1)
RBC # BLD AUTO: 2.4 MILL/UL (ref 4.7–6.1)
SODIUM SERPL-SCNC: 138 MMOL/L (ref 136–145)
WBC # BLD AUTO: 4.5 THOU/UL (ref 4.8–10.8)

## 2019-12-25 NOTE — DIS
DATE OF ADMISSION:  12/24/2019



DATE OF DISCHARGE:  12/25/2019



DISCHARGE DISPOSITION:  Home.



FOLLOWUP:  

1. Follow up with primary care physician Kassy Mercer next week.

2. Repeat CBC and basic metabolic profile next week are recommended.  Primary care

physician advised to follow. 



DISCHARGE MEDICATIONS:  Carvedilol dose was reduced to 3.125 b.i.d.  The patient was

advised to discontinue Bactrim for now. 



The patient was seen on the day of discharge.  Denies any new complaints.  No chest

pain, shortness of breath, or palpitations reported. 



SIGNIFICANT LABORATORY DATA:  Creatinine on admission 2.63, at discharge 1.5.

Sodium 133 on admission and 135 on discharge.  Hemoglobin 8.0 with hematocrit 24.2.

Eosinophil smear was negative.  Urinalysis showed 7 to 10 wbc's without any

bacteria.  Blood cultures and urine cultures are negative so far.  Primary care

physician advised to follow up on the final cultures. 



Chest x-ray was negative for acute findings. 



Lumbar spine CT showed broad-based disk bulge at L4 and L5 with moderately severe

spinal canal stenosis. 



CT of the cervical spine and thoracic spine was negative for acute fractures. 



CT scan of the brain was negative for acute findings.



BRIEF HOSPITAL COURSE:  The patient is a 59-year-old male presented to the emergency

room with altered mentation.  He was recently discharged from [facility] in

[place].  Please refer to the history and physical for further details. 



The patient was admitted to the hospital with a diagnosis of acute kidney injury,

probably secondary to Bactrim.  His mentation improved with IV hydration.  Fentanyl

patch was reduced to 50 mcg during this hospital stay.  His renal function has

improved to 1.50 from 2.63.  The patient is requesting to be discharged.  He was

advised to maintain adequate oral intake.  Renal ultrasound was negative for

obstructive uropathy.  Repeat CBC and basic metabolic profile next week are

recommended.  Primary care physician advised to follow.  He was advised to avoid

nephrotoxic agent.  He was also evaluated by Dr. Toledo, who recommended

discontinuation of Bactrim. 



FINAL DIAGNOSES:  

1. Toxic metabolic encephalopathy, multifactorial.

2. Acute kidney injury on chronic kidney disease stage 2, multifactorial, improving.

3. Hyponatremia.

4. Moderate protein calorie malnutrition.

5. History of pulmonary embolism, on anticoagulation.

6. Recent hospitalization at [facility] for sepsis, septic shock complicated

with pulmonary embolism. 

7. Gastroesophageal reflux disease.

8. Restless legs syndrome.

9. Anxiety.

10. Depression, mild stable.  The patient denies any suicidal ideation.

11. History of HIV, followed by Dr. Toledo.

12. Macrocytic anemia.



PLAN:  Plan of care was discussed with the patient in detail.  He stated

understanding. 







Job ID:  [id number]

## 2019-12-25 NOTE — CON
DATE OF CONSULTATION:  12/25/2019



REASON FOR CONSULTATION:  Possible sepsis.



HISTORY OF PRESENT ILLNESS:  A 59-year-old gentleman whom I follow in the clinic

usually once a year with a history of HIV seropositive status.  He is on 
Genvoya for

many years now, had been on Atripla before and switched because of a CNS side

effects.  His last CD4 cell count was in the 1400 range and viral load less 
than 20

in November 2018.  This year he has had 2 procedures in the C-spine, one done in

January and then because of persistence of symptoms, he got a second opinion.  
In

[place], he had a second procedure via the posterior approach with fusion from

C2-T2.  Postoperatively, the patient had reported sepsis from ESBL E coli 
urinary

tract infection.  I do not have documentation of this, but he also had diagnosed

pulmonary embolism and was placed on Eliquis.  After discharge, he developed 
altered

mental status and was found by his partner on the floor, was brought to the

emergency room at [facility] pretty much about 4 to 5 days after 
discharge

from [facility].  He did not have headaches.  No visual symptoms, sore 
throat,

cough, or sputum production.  No dyspnea.  No chest pain.  Not much pain at the

surgical site.  He did have weakness in the lower extremities.  Some 
constipation,

which was being managed with MiraLAX.  No dysuria or difficulty with voiding.  
No

joint symptoms. 



PAST MEDICAL HISTORY:  HIV infection, recurrent HSV, GERD, diverticulosis,

hyperlipidemia, hypertension, rotator cuff surgery, anterior diskectomy and 
fusion

C3-C4, T3-T4 laminectomy, and recent fusion in [facility], pulmonary 
embolism

recently diagnosed, and ESBL E coli sepsis diagnosed postoperatively. 



ALLERGIES:  NONE.



CURRENT MEDICATIONS:  

1. P.r.n. Norco.

2. Eliquis.

3. Coreg.

4. Colace.

5. Lexapro.

6. Duragesic.

7. Neurontin.

8. Genvoya.

9. Protonix.

10. Metamucil.

11. Requip.

12. Zanaflex.

13. Zonisamide.



PHYSICAL EXAMINATION:

VITAL SIGNS:  He has been afebrile in the hospital stay.  /67, pulse 90,

respirations 18, and O2 saturation 94%. 

PSYCH:  Still a little bit confused.  He has a hard time remembering the dates 
of

discharge from [facility], but a little bit confused with his room numbers

here in the hospital. 

SKIN:  Shows the posterior fusion in the C-spine with mild erythema, but no

drainage.  No tenderness or swelling.  He has a peripheral IV access.  He is 
voiding

spontaneously.  No lymphadenopathy. 

HEENT:  Ocular movements conjugate.  Mild periorbital edema.  Oral cavity 
normal.

Teeth in good shape. 

NECK:  Neck collar in place. 

LUNGS:  Symmetric.  Clear breath sounds. 

HEART:  S1 and S2.  Regular rate.  No S3 or S4. 

ABDOMEN:  Soft, not distended, or tender.  Somewhat protuberant from the 
panniculus.

 No ascites.  No bladder distention.  No genital abnormalities. 

EXTREMITIES:  No joint inflammatory activity.  The patient has 2+ edema in lower

extremities.  Pulses 1+ in dorsalis pedis. 

NEURO:  Shows normal strength.  He is able to bear weight in extremities and 
able to

ambulate without difficulty.  He is awake, oriented, follows commands.  A 
little bit

of difficulty with recollection and orientation, but has improved since 
admission. 



LABORATORY STUDIES:  White cell count is 8.9 and now 4.5, hemoglobin 9.9 and 8.0
,

platelets are 603 and 469, and 66% neutrophils.  Sodium 133, now 138; 
creatinine was

2.6, now 1.5.  AST 36, ALT 17, alkaline phosphatase 117, albumin 3.8 and 3.0.

Urinalysis on arrival with 7 to 10 wbc's.  Two sets of blood cultures no growth 
thus

far.  Influenza A and B with negative results.  Chest x-ray with no 
infiltrates.  CT

of lumbar, thoracic, cervical spine with some findings and the postop changes 
from

fusion.  There is a brain CT which was not remarkable. 



ASSESSMENT:  

1. Longstanding human immunodeficiency virus infection with excellent control of

viremia and adequate CD4. 

2. Recurrent herpes simplex virus, usually manifesting as stomatitis.

3. Recent C-spine fusion with postop complications including Escherichia coli

extended-spectrum beta-lactamase urinary tract sepsis and pulmonary embolism 
managed

in [place] today.  The patient was discharged without antimicrobial therapy and 
on

Eliquis. 

4. Altered mental status with fall, possibly associated with opioid medication 
for

management of analgesia postoperatively associated with acute renal 
insufficiency. 



DISCUSSION:  At this point, we do not have unequivocal evidence to suggest

persistence of the urinary tract process and I would not advise antimicrobial

therapy for that at this point in time.  Continue Genvoya as previously and 
check

CD4 cell count and viral load.  Still a little bit confused, probably 
worthwhile to

keep him here until tomorrow to make sure that there is stability in his further

improvement in renal function. 







Job ID:  [id number]



[facility]

## 2019-12-27 LAB
CD3+CD4+ CELLS # BLD: 744 /UL (ref 359–1519)
CD3+CD4+ CELLS NFR BLD: 46.5 % (ref 30.8–58.5)
LYMPHOCYTES # BLD AUTO: 1.6 X10E3/UL (ref 0.7–3.1)
LYMPHOCYTES NFR BLD AUTO: 33 %
NRBC BLD AUTO-RTO: (no result) %
WBC # BLD AUTO: 4.9 X10E3/UL (ref 3.4–10.8)

## 2020-03-18 ENCOUNTER — HOSPITAL ENCOUNTER (OUTPATIENT)
Dept: HOSPITAL 92 - SCSCT | Age: 60
Discharge: HOME | End: 2020-03-18
Payer: COMMERCIAL

## 2020-03-18 DIAGNOSIS — Z47.89: Primary | ICD-10-CM

## 2020-03-18 DIAGNOSIS — M47.812: ICD-10-CM

## 2020-03-18 DIAGNOSIS — Z98.1: ICD-10-CM

## 2020-03-18 DIAGNOSIS — M48.02: ICD-10-CM

## 2020-03-18 PROCEDURE — 72125 CT NECK SPINE W/O DYE: CPT

## 2020-03-18 NOTE — CT
CT Cervical Spine WO Con



HISTORY: Follow-up postsurgery.



COMPARISON: 12/24/2019 exam.



FINDINGS: Extensive postoperative changes are noted. Postop fusion hardware is seen extending from C2
 to the T3 level. There is anterior plate and screws placed at the C3-4 level. The laminectomy

changes extend from C2 to the C7 level.



Stable compression changes of the superior endplate of T1 and T2.



CT 3: Mild bilateral foraminal narrowing.



C3-4: Moderate bilateral foraminal stenosis more severe on the left.



C4-5: Moderate bilateral foraminal stenosis more severe on the right.



C5-6: Bilateral foraminal narrowing more severe on the left.





C6-7: No significant foraminal stenosis.



IMPRESSION: 

1. Stable postop changes of the spine.

2. Multilevel areas of foraminal stenosis with prominent degenerative facet changes noted.



Reported By: Jermain Ely 

Electronically Signed:  3/18/2020 10:59 AM

## 2020-03-27 ENCOUNTER — HOSPITAL ENCOUNTER (OUTPATIENT)
Dept: HOSPITAL 92 - SCSMRI | Age: 60
Discharge: HOME | End: 2020-03-27
Attending: ORTHOPAEDIC SURGERY
Payer: COMMERCIAL

## 2020-03-27 DIAGNOSIS — M75.41: Primary | ICD-10-CM

## 2020-03-27 DIAGNOSIS — M25.811: ICD-10-CM

## 2020-03-27 DIAGNOSIS — M75.101: ICD-10-CM

## 2020-03-27 DIAGNOSIS — M19.011: ICD-10-CM

## 2020-03-27 NOTE — MRI
EXAM:

RIGHT SHOULDER MRI WITHOUT IV CONTRAST: 

3/27/20

 

HISTORY: 

Impingement syndrome right shoulder. 

 

FINDINGS: 

There are AC joint arthrosis changes with downsloping of the lateral acromion and minimal undersurfac
e spurring as well as downsloping of the anterior acromion with fluid in the subacromial subdeltoid b
ursa and some subchondral cystic changes of the AC joint. There is a nonretracted insertional tear of
 the supraspinatus tendon particularly anteriorly with posterior extension of the tear involving the 
undersurface with some partial thickness undersurface retraction back to the level of the rotator cab
le extending into the conjoint tendon as well as the anterior infraspinatus tendon with some minimal 
associated delamination. Subscapularis tendinopathy with undersurface and minimally delaminating tear
s, small. Thickening and increased signal of the bicipital tendon at the level of the upper bicipital
 groove and intra-articularly evidence for focal tendinopathy with some probable associated interstit
ial tearing. Minimal abnormal signal and some associated free edge irregularity of the posterior port
ion of the superior labrum, evidence for a SLAP type tear extending posteriorly primarily. Mild muscl
e volume loss of the supraspinatus muscle. No acute osteochondral defect or abnormal marrow edema. 

 

IMPRESSION: 

Rotator cuff tears and associated tendinopathy. AC joint arthrosis with downsloping of the anterior a
nd lateral acromion. Mild muscle volume loss of the supraspinatus muscle. Small poorly defined labrum
 in the region of the posterior superior labrum probably related to degenerative tearing and/or frayi
ng. 

 

 

 

POS: RRE

## 2021-05-18 ENCOUNTER — APPOINTMENT (RX ONLY)
Dept: URBAN - METROPOLITAN AREA CLINIC 99 | Facility: CLINIC | Age: 61
Setting detail: DERMATOLOGY
End: 2021-05-18

## 2021-05-18 DIAGNOSIS — L82.1 OTHER SEBORRHEIC KERATOSIS: ICD-10-CM

## 2021-05-18 DIAGNOSIS — Z71.89 OTHER SPECIFIED COUNSELING: ICD-10-CM

## 2021-05-18 DIAGNOSIS — D22 MELANOCYTIC NEVI: ICD-10-CM

## 2021-05-18 DIAGNOSIS — D18.0 HEMANGIOMA: ICD-10-CM

## 2021-05-18 DIAGNOSIS — L20.89 OTHER ATOPIC DERMATITIS: ICD-10-CM

## 2021-05-18 DIAGNOSIS — L82.0 INFLAMED SEBORRHEIC KERATOSIS: ICD-10-CM

## 2021-05-18 DIAGNOSIS — L70.0 ACNE VULGARIS: ICD-10-CM

## 2021-05-18 PROBLEM — D22.71 MELANOCYTIC NEVI OF RIGHT LOWER LIMB, INCLUDING HIP: Status: ACTIVE | Noted: 2021-05-18

## 2021-05-18 PROBLEM — D22.5 MELANOCYTIC NEVI OF TRUNK: Status: ACTIVE | Noted: 2021-05-18

## 2021-05-18 PROBLEM — L20.84 INTRINSIC (ALLERGIC) ECZEMA: Status: ACTIVE | Noted: 2021-05-18

## 2021-05-18 PROBLEM — D22.4 MELANOCYTIC NEVI OF SCALP AND NECK: Status: ACTIVE | Noted: 2021-05-18

## 2021-05-18 PROBLEM — D18.01 HEMANGIOMA OF SKIN AND SUBCUTANEOUS TISSUE: Status: ACTIVE | Noted: 2021-05-18

## 2021-05-18 PROCEDURE — 17110 DESTRUCTION B9 LES UP TO 14: CPT

## 2021-05-18 PROCEDURE — ? SUNSCREEN RECOMMENDATIONS

## 2021-05-18 PROCEDURE — ? LIQUID NITROGEN (COSMETIC)

## 2021-05-18 PROCEDURE — ? LIQUID NITROGEN

## 2021-05-18 PROCEDURE — ? COUNSELING

## 2021-05-18 PROCEDURE — 99203 OFFICE O/P NEW LOW 30 MIN: CPT | Mod: 25

## 2021-05-18 PROCEDURE — ? PRESCRIPTION

## 2021-05-18 RX ORDER — TRETINOIN 0.5 MG/G
LOTION TOPICAL
Qty: 1 | Refills: 1 | Status: ERX | COMMUNITY
Start: 2021-05-18

## 2021-05-18 RX ORDER — HALOBETASOL PROPIONATE 0.5 MG/G
CREAM TOPICAL
Qty: 1 | Refills: 2 | Status: ERX | COMMUNITY
Start: 2021-05-18

## 2021-05-18 RX ADMIN — TRETINOIN: 0.5 LOTION TOPICAL at 00:00

## 2021-05-18 RX ADMIN — HALOBETASOL PROPIONATE: 0.5 CREAM TOPICAL at 00:00

## 2021-05-18 ASSESSMENT — LOCATION SIMPLE DESCRIPTION DERM
LOCATION SIMPLE: GLABELLA
LOCATION SIMPLE: NOSE
LOCATION SIMPLE: CHEST
LOCATION SIMPLE: LEFT NOSE
LOCATION SIMPLE: LEFT KNEE
LOCATION SIMPLE: LEFT CHEEK
LOCATION SIMPLE: RIGHT POPLITEAL SKIN
LOCATION SIMPLE: LEFT PRETIBIAL REGION
LOCATION SIMPLE: LEFT SHOULDER
LOCATION SIMPLE: RIGHT CHEEK
LOCATION SIMPLE: UPPER BACK
LOCATION SIMPLE: RIGHT PRETIBIAL REGION
LOCATION SIMPLE: RIGHT ANTERIOR NECK
LOCATION SIMPLE: RIGHT KNEE

## 2021-05-18 ASSESSMENT — LOCATION DETAILED DESCRIPTION DERM
LOCATION DETAILED: LEFT NASAL ALA
LOCATION DETAILED: SUPERIOR THORACIC SPINE
LOCATION DETAILED: RIGHT CLAVICULAR NECK
LOCATION DETAILED: GLABELLA
LOCATION DETAILED: LEFT ANTERIOR SHOULDER
LOCATION DETAILED: LEFT LATERAL MALAR CHEEK
LOCATION DETAILED: LEFT SUPERIOR LATERAL BUCCAL CHEEK
LOCATION DETAILED: INFERIOR THORACIC SPINE
LOCATION DETAILED: RIGHT DISTAL PRETIBIAL REGION
LOCATION DETAILED: RIGHT POPLITEAL SKIN
LOCATION DETAILED: RIGHT KNEE
LOCATION DETAILED: LEFT DISTAL PRETIBIAL REGION
LOCATION DETAILED: RIGHT CENTRAL MALAR CHEEK
LOCATION DETAILED: STERNUM
LOCATION DETAILED: NASAL SUPRATIP
LOCATION DETAILED: LEFT KNEE

## 2021-05-18 ASSESSMENT — LOCATION ZONE DERM
LOCATION ZONE: ARM
LOCATION ZONE: NOSE
LOCATION ZONE: FACE
LOCATION ZONE: TRUNK
LOCATION ZONE: LEG
LOCATION ZONE: NECK

## 2021-05-18 NOTE — PROCEDURE: LIQUID NITROGEN (COSMETIC)
Render Post-Care Instructions In Note?: no
Detail Level: Detailed
Consent: The patient's consent was obtained including but not limited to risks of crusting, scabbing, blistering, scarring, darker or lighter pigmentary change, recurrence, incomplete removal and infection. The patient understands that the procedure is cosmetic in nature and is not covered by insurance.
Post-Care Instructions: I reviewed with the patient in detail post-care instructions. Patient is to wear sunprotection, and avoid picking at any of the treated lesions. Pt may apply Vaseline to crusted or scabbing areas.
Billing Information: Bill by [employer]

## 2021-05-18 NOTE — PROCEDURE: COUNSELING
Detail Level: Detailed
Minocycline Pregnancy And Lactation Text: This medication is Pregnancy Category D and not consider safe during pregnancy. It is also excreted in breast milk.
Birth Control Pills Counseling: Birth Control Pill Counseling: I discussed with the patient the potential side effects of OCPs including but not limited to increased risk of stroke, heart attack, thrombophlebitis, deep venous thrombosis, hepatic adenomas, breast changes, GI upset, headaches, and depression.  The patient verbalized understanding of the proper use and possible adverse effects of OCPs. All of the patient's questions and concerns were addressed.
Topical Clindamycin Pregnancy And Lactation Text: This medication is Pregnancy Category B and is considered safe during pregnancy. It is unknown if it is excreted in breast milk.
Isotretinoin Pregnancy And Lactation Text: This medication is Pregnancy Category X and is considered extremely dangerous during pregnancy. It is unknown if it is excreted in breast milk.
Detail Level: Zone
Spironolactone Pregnancy And Lactation Text: This medication can cause feminization of the male fetus and should be avoided during pregnancy. The active metabolite is also found in breast milk.
Topical Clindamycin Counseling: Patient counseled that this medication may cause skin irritation or allergic reactions.  In the event of skin irritation, the patient was advised to reduce the amount of the drug applied or use it less frequently.   The patient verbalized understanding of the proper use and possible adverse effects of clindamycin.  All of the patient's questions and concerns were addressed.
Topical Retinoid counseling:  Patient advised to apply a pea-sized amount only at bedtime and wait 30 minutes after washing their face before applying.  If too drying, patient may add a non-comedogenic moisturizer. The patient verbalized understanding of the proper use and possible adverse effects of retinoids.  All of the patient's questions and concerns were addressed.
Doxycycline Counseling:  Patient counseled regarding possible photosensitivity and increased risk for sunburn.  Patient instructed to avoid sunlight, if possible.  When exposed to sunlight, patients should wear protective clothing, sunglasses, and sunscreen.  The patient was instructed to call the office immediately if the following severe adverse effects occur:  hearing changes, easy bruising/bleeding, severe headache, or vision changes.  The patient verbalized understanding of the proper use and possible adverse effects of doxycycline.  All of the patient's questions and concerns were addressed.
Topical Sulfur Applications Counseling: Topical Sulfur Counseling: Patient counseled that this medication may cause skin irritation or allergic reactions.  In the event of skin irritation, the patient was advised to reduce the amount of the drug applied or use it less frequently.   The patient verbalized understanding of the proper use and possible adverse effects of topical sulfur application.  All of the patient's questions and concerns were addressed.
High Dose Vitamin A Counseling: Side effects reviewed, pt to contact office should one occur.
Topical Retinoid Pregnancy And Lactation Text: This medication is Pregnancy Category C. It is unknown if this medication is excreted in breast milk.
Birth Control Pills Pregnancy And Lactation Text: This medication should be avoided if pregnant and for the first 30 days post-partum.
Use Enhanced Medication Counseling?: No
Doxycycline Pregnancy And Lactation Text: This medication is Pregnancy Category D and not consider safe during pregnancy. It is also excreted in breast milk but is considered safe for shorter treatment courses.
Tetracycline Counseling: Patient counseled regarding possible photosensitivity and increased risk for sunburn.  Patient instructed to avoid sunlight, if possible.  When exposed to sunlight, patients should wear protective clothing, sunglasses, and sunscreen.  The patient was instructed to call the office immediately if the following severe adverse effects occur:  hearing changes, easy bruising/bleeding, severe headache, or vision changes.  The patient verbalized understanding of the proper use and possible adverse effects of tetracycline.  All of the patient's questions and concerns were addressed. Patient understands to avoid pregnancy while on therapy due to potential birth defects.
Sarecycline Counseling: Patient advised regarding possible photosensitivity and discoloration of the teeth, skin, lips, tongue and gums.  Patient instructed to avoid sunlight, if possible.  When exposed to sunlight, patients should wear protective clothing, sunglasses, and sunscreen.  The patient was instructed to call the office immediately if the following severe adverse effects occur:  hearing changes, easy bruising/bleeding, severe headache, or vision changes.  The patient verbalized understanding of the proper use and possible adverse effects of sarecycline.  All of the patient's questions and concerns were addressed.
Azithromycin Counseling:  I discussed with the patient the risks of azithromycin including but not limited to GI upset, allergic reaction, drug rash, diarrhea, and yeast infections.
Benzoyl Peroxide Counseling: Patient counseled that medicine may cause skin irritation and bleach clothing.  In the event of skin irritation, the patient was advised to reduce the amount of the drug applied or use it less frequently.   The patient verbalized understanding of the proper use and possible adverse effects of benzoyl peroxide.  All of the patient's questions and concerns were addressed.
Dapsone Counseling: I discussed with the patient the risks of dapsone including but not limited to hemolytic anemia, agranulocytosis, rashes, methemoglobinemia, kidney failure, peripheral neuropathy, headaches, GI upset, and liver toxicity.  Patients who start dapsone require monitoring including baseline LFTs and weekly CBCs for the first month, then every month thereafter.  The patient verbalized understanding of the proper use and possible adverse effects of dapsone.  All of the patient's questions and concerns were addressed.
Topical Sulfur Applications Pregnancy And Lactation Text: This medication is Pregnancy Category C and has an unknown safety profile during pregnancy. It is unknown if this topical medication is excreted in breast milk.
Bactrim Counseling:  I discussed with the patient the risks of sulfa antibiotics including but not limited to GI upset, allergic reaction, drug rash, diarrhea, dizziness, photosensitivity, and yeast infections.  Rarely, more serious reactions can occur including but not limited to aplastic anemia, agranulocytosis, methemoglobinemia, blood dyscrasias, liver or kidney failure, lung infiltrates or desquamative/blistering drug rashes.
Azithromycin Pregnancy And Lactation Text: This medication is considered safe during pregnancy and is also secreted in breast milk.
High Dose Vitamin A Pregnancy And Lactation Text: High dose vitamin A therapy is contraindicated during pregnancy and breast feeding.
Tazorac Counseling:  Patient advised that medication is irritating and drying.  Patient may need to apply sparingly and wash off after an hour before eventually leaving it on overnight.  The patient verbalized understanding of the proper use and possible adverse effects of tazorac.  All of the patient's questions and concerns were addressed.
Erythromycin Counseling:  I discussed with the patient the risks of erythromycin including but not limited to GI upset, allergic reaction, drug rash, diarrhea, increase in liver enzymes, and yeast infections.
Minocycline Counseling: Patient advised regarding possible photosensitivity and discoloration of the teeth, skin, lips, tongue and gums.  Patient instructed to avoid sunlight, if possible.  When exposed to sunlight, patients should wear protective clothing, sunglasses, and sunscreen.  The patient was instructed to call the office immediately if the following severe adverse effects occur:  hearing changes, easy bruising/bleeding, severe headache, or vision changes.  The patient verbalized understanding of the proper use and possible adverse effects of minocycline.  All of the patient's questions and concerns were addressed.
Benzoyl Peroxide Pregnancy And Lactation Text: This medication is Pregnancy Category C. It is unknown if benzoyl peroxide is excreted in breast milk.
Isotretinoin Counseling: Patient should get monthly blood tests, not donate blood, not drive at night if vision affected, not share medication, and not undergo elective surgery for 6 months after tx completed. Side effects reviewed, pt to contact office should one occur.
Bactrim Pregnancy And Lactation Text: This medication is Pregnancy Category D and is known to cause fetal risk.  It is also excreted in breast milk.
Tazorac Pregnancy And Lactation Text: This medication is not safe during pregnancy. It is unknown if this medication is excreted in breast milk.
Erythromycin Pregnancy And Lactation Text: This medication is Pregnancy Category B and is considered safe during pregnancy. It is also excreted in breast milk.
Spironolactone Counseling: Patient advised regarding risks of diarrhea, abdominal pain, hyperkalemia, birth defects (for female patients), liver toxicity and renal toxicity. The patient may need blood work to monitor liver and kidney function and potassium levels while on therapy. The patient verbalized understanding of the proper use and possible adverse effects of spironolactone.  All of the patient's questions and concerns were addressed.
Dapsone Pregnancy And Lactation Text: This medication is Pregnancy Category C and is not considered safe during pregnancy or breast feeding.

## 2022-07-13 ENCOUNTER — HOSPITAL ENCOUNTER (EMERGENCY)
Dept: HOSPITAL 92 - CSHERS | Age: 62
LOS: 1 days | Discharge: HOME | End: 2022-07-14
Payer: MEDICARE

## 2022-07-13 DIAGNOSIS — E86.0: Primary | ICD-10-CM

## 2022-07-13 DIAGNOSIS — E78.5: ICD-10-CM

## 2022-07-13 DIAGNOSIS — K21.9: ICD-10-CM

## 2022-07-13 DIAGNOSIS — I10: ICD-10-CM

## 2022-07-13 DIAGNOSIS — R19.7: ICD-10-CM

## 2022-07-13 DIAGNOSIS — R11.2: ICD-10-CM

## 2022-07-13 DIAGNOSIS — B20: ICD-10-CM

## 2022-07-13 DIAGNOSIS — Z20.822: ICD-10-CM

## 2022-07-13 LAB
ALBUMIN SERPL BCG-MCNC: 4.8 G/DL (ref 3.5–5)
ALP SERPL-CCNC: 42 U/L (ref 40–110)
ALT SERPL W P-5'-P-CCNC: 20 U/L (ref 8–55)
ANION GAP SERPL CALC-SCNC: 17 MMOL/L (ref 10–20)
AST SERPL-CCNC: 19 U/L (ref 5–34)
BASOPHILS # BLD AUTO: 0.1 10X3/UL (ref 0–0.2)
BASOPHILS NFR BLD AUTO: 0.5 % (ref 0–2)
BILIRUB SERPL-MCNC: 0.8 MG/DL (ref 0.2–1.2)
BUN SERPL-MCNC: 12 MG/DL (ref 8.4–25.7)
CALCIUM SERPL-MCNC: 11 MG/DL (ref 7.8–10.44)
CHLORIDE SERPL-SCNC: 102 MMOL/L (ref 98–107)
CK SERPL-CCNC: 98 U/L (ref 30–200)
CO2 SERPL-SCNC: 24 MMOL/L (ref 22–29)
CREAT CL PREDICTED SERPL C-G-VRATE: 0 ML/MIN (ref 70–130)
EOSINOPHIL # BLD AUTO: 0 10X3/UL (ref 0–0.5)
EOSINOPHIL NFR BLD AUTO: 0.2 % (ref 0–6)
GLOBULIN SER CALC-MCNC: 3.3 G/DL (ref 2.4–3.5)
GLUCOSE SERPL-MCNC: 123 MG/DL (ref 70–105)
HGB BLD-MCNC: 15.8 G/DL (ref 13.5–17.5)
LIPASE SERPL-CCNC: 19 U/L (ref 8–78)
LYMPHOCYTES NFR BLD AUTO: 15.7 % (ref 18–47)
MCH RBC QN AUTO: 35 PG (ref 27–33)
MCV RBC AUTO: 100.4 FL (ref 81.2–95.1)
MONOCYTES # BLD AUTO: 0.9 10X3/UL (ref 0–1.1)
MONOCYTES NFR BLD AUTO: 9 % (ref 0–10)
NEUTROPHILS # BLD AUTO: 7.1 10X3/UL (ref 1.5–8.4)
NEUTROPHILS NFR BLD AUTO: 74.3 % (ref 40–75)
PLATELET # BLD AUTO: 287 10X3/UL (ref 150–450)
POTASSIUM SERPL-SCNC: 4.2 MMOL/L (ref 3.5–5.1)
RBC # BLD AUTO: 4.51 10X6/UL (ref 4.32–5.72)
SODIUM SERPL-SCNC: 139 MMOL/L (ref 136–145)
WBC # BLD AUTO: 9.6 10X3/UL (ref 3.5–10.5)

## 2022-07-13 PROCEDURE — 81003 URINALYSIS AUTO W/O SCOPE: CPT

## 2022-07-13 PROCEDURE — 83690 ASSAY OF LIPASE: CPT

## 2022-07-13 PROCEDURE — 36415 COLL VENOUS BLD VENIPUNCTURE: CPT

## 2022-07-13 PROCEDURE — 85025 COMPLETE CBC W/AUTO DIFF WBC: CPT

## 2022-07-13 PROCEDURE — 96375 TX/PRO/DX INJ NEW DRUG ADDON: CPT

## 2022-07-13 PROCEDURE — 99284 EMERGENCY DEPT VISIT MOD MDM: CPT

## 2022-07-13 PROCEDURE — 80053 COMPREHEN METABOLIC PANEL: CPT

## 2022-07-13 PROCEDURE — 96361 HYDRATE IV INFUSION ADD-ON: CPT

## 2022-07-13 PROCEDURE — 82550 ASSAY OF CK (CPK): CPT

## 2022-07-13 PROCEDURE — 74177 CT ABD & PELVIS W/CONTRAST: CPT

## 2022-07-13 PROCEDURE — 96376 TX/PRO/DX INJ SAME DRUG ADON: CPT

## 2022-07-13 PROCEDURE — 96372 THER/PROPH/DIAG INJ SC/IM: CPT

## 2022-07-13 PROCEDURE — 96374 THER/PROPH/DIAG INJ IV PUSH: CPT

## 2022-07-13 PROCEDURE — U0002 COVID-19 LAB TEST NON-CDC: HCPCS

## 2022-07-14 LAB — SP GR UR STRIP: 1.01 (ref 1–1.04)

## 2022-08-09 ENCOUNTER — HOSPITAL ENCOUNTER (OUTPATIENT)
Dept: HOSPITAL 92 - CSHULT | Age: 62
Discharge: HOME | End: 2022-08-09
Attending: PHYSICIAN ASSISTANT
Payer: MEDICARE

## 2022-08-09 DIAGNOSIS — Z86.718: ICD-10-CM

## 2022-08-09 DIAGNOSIS — R60.0: Primary | ICD-10-CM

## 2022-08-09 DIAGNOSIS — K59.03: ICD-10-CM

## 2022-08-09 DIAGNOSIS — T40.2X5A: ICD-10-CM

## 2022-08-09 PROCEDURE — 93970 EXTREMITY STUDY: CPT

## 2022-11-08 ENCOUNTER — HOSPITAL ENCOUNTER (EMERGENCY)
Dept: HOSPITAL 92 - CSHERS | Age: 62
LOS: 1 days | Discharge: HOME | End: 2022-11-09
Payer: MEDICARE

## 2022-11-08 DIAGNOSIS — E78.5: ICD-10-CM

## 2022-11-08 DIAGNOSIS — K21.9: ICD-10-CM

## 2022-11-08 DIAGNOSIS — I10: ICD-10-CM

## 2022-11-08 DIAGNOSIS — K29.00: Primary | ICD-10-CM

## 2022-11-08 DIAGNOSIS — Z79.899: ICD-10-CM

## 2022-11-08 PROCEDURE — 96372 THER/PROPH/DIAG INJ SC/IM: CPT

## 2022-11-08 PROCEDURE — 81015 MICROSCOPIC EXAM OF URINE: CPT

## 2022-11-08 PROCEDURE — 83605 ASSAY OF LACTIC ACID: CPT

## 2022-11-08 PROCEDURE — 83690 ASSAY OF LIPASE: CPT

## 2022-11-08 PROCEDURE — 96375 TX/PRO/DX INJ NEW DRUG ADDON: CPT

## 2022-11-08 PROCEDURE — 96374 THER/PROPH/DIAG INJ IV PUSH: CPT

## 2022-11-08 PROCEDURE — 81003 URINALYSIS AUTO W/O SCOPE: CPT

## 2022-11-08 PROCEDURE — 80053 COMPREHEN METABOLIC PANEL: CPT

## 2022-11-08 PROCEDURE — 74177 CT ABD & PELVIS W/CONTRAST: CPT

## 2022-11-08 PROCEDURE — 85025 COMPLETE CBC W/AUTO DIFF WBC: CPT

## 2022-11-09 LAB
ALBUMIN SERPL BCG-MCNC: 4.8 G/DL (ref 3.5–5)
ALP SERPL-CCNC: 40 U/L (ref 40–110)
ALT SERPL W P-5'-P-CCNC: 20 U/L (ref 8–55)
ANION GAP SERPL CALC-SCNC: 15 MMOL/L (ref 10–20)
AST SERPL-CCNC: 19 U/L (ref 5–34)
BILIRUB SERPL-MCNC: 1.1 MG/DL (ref 0.2–1.2)
BUN SERPL-MCNC: 24 MG/DL (ref 8.4–25.7)
CALCIUM SERPL-MCNC: 10 MG/DL (ref 7.8–10.44)
CHLORIDE SERPL-SCNC: 103 MMOL/L (ref 98–107)
CO2 SERPL-SCNC: 23 MMOL/L (ref 22–29)
CREAT CL PREDICTED SERPL C-G-VRATE: 0 ML/MIN (ref 70–130)
GLOBULIN SER CALC-MCNC: 2.4 G/DL (ref 2.4–3.5)
GLUCOSE SERPL-MCNC: 124 MG/DL (ref 70–105)
HGB BLD-MCNC: 14 G/DL (ref 13.5–17.5)
LIPASE SERPL-CCNC: 40 U/L (ref 8–78)
MCH RBC QN AUTO: 36.5 PG (ref 27–33)
MCV RBC AUTO: 100.3 FL (ref 81.2–95.1)
MDIFF COMPLETE?: YES
PLATELET # BLD AUTO: 348 10X3/UL (ref 150–450)
POTASSIUM SERPL-SCNC: 4.2 MMOL/L (ref 3.5–5.1)
PROT UR STRIP.AUTO-MCNC: 30 MG/DL
RBC # BLD AUTO: 3.84 10X6/UL (ref 4.32–5.72)
RBC UR QL AUTO: (no result) HPF (ref 0–3)
SODIUM SERPL-SCNC: 137 MMOL/L (ref 136–145)
SP GR UR STRIP: 1.01 (ref 1–1.03)
WBC # BLD AUTO: 7.8 10X3/UL (ref 3.5–10.5)
WBC UR QL AUTO: (no result) HPF (ref 0–3)